# Patient Record
Sex: MALE | Race: WHITE | NOT HISPANIC OR LATINO | Employment: PART TIME | URBAN - NONMETROPOLITAN AREA
[De-identification: names, ages, dates, MRNs, and addresses within clinical notes are randomized per-mention and may not be internally consistent; named-entity substitution may affect disease eponyms.]

---
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
## 2021-04-28 ENCOUNTER — ALLIED HEALTH/NURSE VISIT (OUTPATIENT)
Dept: FAMILY MEDICINE | Facility: OTHER | Age: 21
End: 2021-04-28
Attending: FAMILY MEDICINE
Payer: COMMERCIAL

## 2021-04-28 DIAGNOSIS — R06.02 SOB (SHORTNESS OF BREATH): ICD-10-CM

## 2021-04-28 DIAGNOSIS — Z20.822 EXPOSURE TO 2019 NOVEL CORONAVIRUS: Primary | ICD-10-CM

## 2021-04-28 DIAGNOSIS — R51.9 HEADACHE: ICD-10-CM

## 2021-04-28 LAB
SARS-COV-2 RNA RESP QL NAA+PROBE: NORMAL
SPECIMEN SOURCE: NORMAL

## 2021-04-28 PROCEDURE — C9803 HOPD COVID-19 SPEC COLLECT: HCPCS

## 2021-04-28 PROCEDURE — U0005 INFEC AGEN DETEC AMPLI PROBE: HCPCS | Mod: ZL | Performed by: FAMILY MEDICINE

## 2021-04-28 PROCEDURE — U0003 INFECTIOUS AGENT DETECTION BY NUCLEIC ACID (DNA OR RNA); SEVERE ACUTE RESPIRATORY SYNDROME CORONAVIRUS 2 (SARS-COV-2) (CORONAVIRUS DISEASE [COVID-19]), AMPLIFIED PROBE TECHNIQUE, MAKING USE OF HIGH THROUGHPUT TECHNOLOGIES AS DESCRIBED BY CMS-2020-01-R: HCPCS | Mod: ZL | Performed by: FAMILY MEDICINE

## 2021-04-29 ENCOUNTER — TELEPHONE (OUTPATIENT)
Dept: FAMILY MEDICINE | Facility: OTHER | Age: 21
End: 2021-04-29

## 2021-04-29 LAB
LABORATORY COMMENT REPORT: ABNORMAL
SARS-COV-2 RNA RESP QL NAA+PROBE: POSITIVE
SPECIMEN SOURCE: ABNORMAL

## 2021-04-29 NOTE — TELEPHONE ENCOUNTER
Coronavirus (COVID-19) Notification    Reason for call  Notify of POSITIVE  COVID-19 lab result, assess symptoms,  review [facility] recommendations    Lab Result   Lab test for 2019-nCoV rRt-PCR or SARS-COV-2 PCR  Oropharyngeal AND/OR nasopharyngeal swabs were POSITIVE for 2019-nCoV RNA [OR] SARS-COV-2 RNA (COVID-19) RNA     We have been unable to reach Patient by phone at this time to notify of their Positive COVID-19 result.  Unable to leave a voicemail message requesting a call back to [phone] [facility] for results due to mailbow not setup.        POSITIVE COVID-19 Letter sent.    Rosa Mario LPN

## 2021-05-04 ENCOUNTER — TELEPHONE (OUTPATIENT)
Dept: FAMILY MEDICINE | Facility: OTHER | Age: 21
End: 2021-05-04

## 2021-05-04 NOTE — TELEPHONE ENCOUNTER
Contains critical data SARS-CoV-2 COVID-19 Virus (Coronavirus) by PCR  Order: [id number]  Status:  Edited Result - FINAL   Visible to patient:  No (inaccessible in [employer]) Dx:  Exposure to 2019 novel coronavirus  Specimen Information: Nasopharyngeal        Component 6d ago   SARS-CoV-2 Virus Specimen Source Nasopharyngeal    SARS-CoV-2 PCR Result POSITIVEPanic     Comment: SARS-CoV2 (COVID-19) RNA detected, presumed positive.   SARS-CoV-2 PCR Comment Testing was performed using the nancy SARS-CoV-2 assay on the nancy 6800 System.2    [facility]         Specimen Collected: 04/28/21 10:20 AM Last Resulted: 04/29/21 12:00 PM           Called and spoke to Patient after verifying last name and date of birth. Pt notified of the above results, and self-isolation recommendations outlined in result letter were reviewed over phone with Pt. The patient indicates understanding of these issues and agrees with the plan. Paloma Patel RN .............. 5/4/2021  3:59 PM

## 2021-07-31 ENCOUNTER — HOSPITAL ENCOUNTER (OUTPATIENT)
Dept: GENERAL RADIOLOGY | Facility: OTHER | Age: 21
End: 2021-07-31
Attending: NURSE PRACTITIONER
Payer: COMMERCIAL

## 2021-07-31 ENCOUNTER — OFFICE VISIT (OUTPATIENT)
Dept: FAMILY MEDICINE | Facility: OTHER | Age: 21
End: 2021-07-31
Attending: NURSE PRACTITIONER
Payer: COMMERCIAL

## 2021-07-31 VITALS
SYSTOLIC BLOOD PRESSURE: 132 MMHG | TEMPERATURE: 98.4 F | HEIGHT: 70 IN | RESPIRATION RATE: 12 BRPM | BODY MASS INDEX: 26.05 KG/M2 | HEART RATE: 84 BPM | DIASTOLIC BLOOD PRESSURE: 86 MMHG | WEIGHT: 182 LBS

## 2021-07-31 DIAGNOSIS — M25.572 PAIN IN JOINT INVOLVING ANKLE AND FOOT, LEFT: ICD-10-CM

## 2021-07-31 DIAGNOSIS — S82.402A CLOSED FRACTURE OF SHAFT OF LEFT FIBULA, UNSPECIFIED FRACTURE MORPHOLOGY, INITIAL ENCOUNTER: Primary | ICD-10-CM

## 2021-07-31 DIAGNOSIS — S99.912A ANKLE INJURY, LEFT, INITIAL ENCOUNTER: ICD-10-CM

## 2021-07-31 PROCEDURE — 73610 X-RAY EXAM OF ANKLE: CPT | Mod: LT

## 2021-07-31 PROCEDURE — 99213 OFFICE O/P EST LOW 20 MIN: CPT | Mod: 25 | Performed by: NURSE PRACTITIONER

## 2021-07-31 PROCEDURE — 29515 APPLICATION SHORT LEG SPLINT: CPT | Performed by: NURSE PRACTITIONER

## 2021-07-31 ASSESSMENT — PAIN SCALES - GENERAL: PAINLEVEL: SEVERE PAIN (7)

## 2021-07-31 ASSESSMENT — MIFFLIN-ST. JEOR: SCORE: 1842.83

## 2021-07-31 NOTE — PROGRESS NOTES
"HPI:    Charles Keller is a 20 year old male  who presents to [facility] today for left ankle    States yesterday he was skate boarding, fell off the board and twisted his left ankle. He is having pain and swelling in his left ankle.  Intermittent numbness and tingling in ankle.  Pain and difficulty bearing weight and ambulating.  Denies any other injuries.  Icing.  Took Aleve 2 tabs yesterday.       Past Medical History:   Diagnosis Date     Personal history of other diseases of the female genital tract     2001,Delivery 41 1/2 weeks gestation via .  One minute Apgar 3; 5 minute Apgar 9.  Possible meconium aspiration syndrome.  Birth weight 6 lbs. 0 oz.     Tachypnea, not elsewhere classified     , tachypnea     Past Surgical History:   Procedure Laterality Date     TONSILLECTOMY, ADENOIDECTOMY, COMBINED      08,for tonsillar hypertrophy and nighttime apnea.     Social History     Tobacco Use     Smoking status: Current Every Day Smoker     Types: Cigarettes     Smokeless tobacco: Never Used   Substance Use Topics     Alcohol use: Not Currently     Comment: Alcoholic Drinks/day: former, couple drinks once a year     Current Outpatient Medications   Medication Sig Dispense Refill     loratadine (CLARITIN) 10 MG tablet Take 10 mg by mouth daily       Allergies   Allergen Reactions     Chicken-Derived Products (Egg) Anaphylaxis     Coffee Bean Extract  [Coffea Arabica] Nausea     Certain kinds     Honey      Other reaction(s): Runny Nose  Itchy eyes     Pollen Extract      Other reaction(s): Runny Nose  Itchy eyes         Past medical history, past surgical history, current medications and allergies reviewed and accurate to the best of my knowledge.        ROS:  Refer to HPI    /86 (BP Location: Right arm, Patient Position: Sitting, Cuff Size: Adult Regular)   Pulse 84   Temp 98.4  F (36.9  C) (Tympanic)   Resp 12   Ht 1.772 m (5' 9.75\")   Wt 82.6 kg (182 lb)   BMI 26.30 " kg/m      EXAM:  General Appearance: Well appearing male adolescent, appropriate appearance for age. No acute distress  Cardiovascular:  CMS intact to left lower extremity, brisk capillary refill, strong pedal pulse  Musculoskeletal:  Equal movement of bilateral upper extremities.  Equal movement of bilateral lower extremities with exception of left ankle.  Left lateral ankle with swelling and tenderness over the lateral malleolus with visible/palpable swelling/deformity.  Mildly decreased ROM of left ankle due to guarding and pain.  Able to wiggle left toes without difficulty.  Normal gait.   Dermatological: skin intact to left lower leg, ankle and foot with some bruising present   Psychological: normal affect, alert, oriented, and pleasant.       Xray:  Results for orders placed or performed in visit on 07/31/21   XR Ankle Left G/E 3 Views     Status: None    Narrative    Exam: XR ANKLE LEFT G/E 3 VIEWS     History:Male, age 20 years, Ankle injury, left, initial encounter;  Pain in joint involving ankle and foot, left    Comparison:  None    Technique: Three views are submitted.    Findings: Bones are normally mineralized. There is a nondisplaced  spiral fracture of the distal fibula at the level of the distal  tibiofibular articulation. Ankle mortise is maintained. No evidence of  dislocation.           Impression    Impression:  Nondisplaced spiral/oblique intraarticular fracture of the distal  fibula extending into the distal tibiofibular articulation.    Anterolateral soft tissue swelling.    RAOUL MORRISSEY MD         SYSTEM ID:  [id number]         ASSESSMENT/PLAN:    I have reviewed the nursing notes.  I have reviewed the findings, diagnosis, plan and need for follow up with the patient.    1. Ankle injury, left, initial encounter    - XR Ankle Left G/E 3 Views    2. Pain in joint involving ankle and foot, left    - XR Ankle Left G/E 3 Views    3. Closed fracture of shaft of left fibula, unspecified  fracture morphology, initial encounter    - APPLY SHORT LEG SPLINT    - Crutches Order for DME - ONLY FOR DME    - Orthopedic  Referral; Future    Xray of left ankle completed and reviewed, fracture of distal fibula appreciated, radiologist over read:  Nondisplaced spiral/oblique intraarticular fracture of the distal fibula extending into the distal tibiofibular articulation.    Patient placed in a well padded short leg splint with ankle stirrup and posterior support.      NWB in splint with crutches    Keep splint clean and dry    Ice and elevate to reduce swelling    May use over-the-counter Tylenol or ibuprofen or naproxen PRN    Referral to orthopedics for evaluation and management         I explained my diagnostic considerations and recommendations to the patient, who voiced understanding and agreement with the treatment plan. All questions were answered. We discussed potential side effects of any prescribed or recommended therapies, as well as expectations for response to treatments.

## 2021-07-31 NOTE — LETTER
[facility]  [address]  [place] [address]  Phone: [phone]  Fax: [phone]    July 31, 2021        Charles Keller  [address] [place] [id number]          To whom it may concern:    RE: Charles Keller    Patient was seen and treated today at our clinic for left ankle fracture.  Patient is required to be no weight bearing on left leg and use crutches or a knee scooter.  Patient is able to work if work accommodations can be met.      Please contact me for questions or concerns.      Sincerely,        Sima Gonzalez NP

## 2021-07-31 NOTE — NURSING NOTE
"Chief Complaint   Patient presents with     Ankle Pain     left- skateboarding injury yesterday around 5-6pm     Ankle is swollen and bruised.    Initial /86 (BP Location: Right arm, Patient Position: Sitting, Cuff Size: Adult Regular)   Pulse 84   Temp 98.4  F (36.9  C) (Tympanic)   Resp 12   Ht 1.772 m (5' 9.75\")   Wt 82.6 kg (182 lb)   BMI 26.30 kg/m   Estimated body mass index is 26.3 kg/m  as calculated from the following:    Height as of this encounter: 1.772 m (5' 9.75\").    Weight as of this encounter: 82.6 kg (182 lb).  Medication Reconciliation: Completed     Henrry Suh LPN  "

## 2021-11-16 ENCOUNTER — ALLIED HEALTH/NURSE VISIT (OUTPATIENT)
Dept: FAMILY MEDICINE | Facility: OTHER | Age: 21
End: 2021-11-16
Attending: FAMILY MEDICINE
Payer: COMMERCIAL

## 2021-11-16 DIAGNOSIS — R11.10 VOMITING: Primary | ICD-10-CM

## 2021-11-16 DIAGNOSIS — R51.9 HEAD ACHE: ICD-10-CM

## 2021-11-16 PROCEDURE — C9803 HOPD COVID-19 SPEC COLLECT: HCPCS

## 2021-11-16 PROCEDURE — U0003 INFECTIOUS AGENT DETECTION BY NUCLEIC ACID (DNA OR RNA); SEVERE ACUTE RESPIRATORY SYNDROME CORONAVIRUS 2 (SARS-COV-2) (CORONAVIRUS DISEASE [COVID-19]), AMPLIFIED PROBE TECHNIQUE, MAKING USE OF HIGH THROUGHPUT TECHNOLOGIES AS DESCRIBED BY CMS-2020-01-R: HCPCS | Mod: ZL

## 2021-11-18 LAB — SARS-COV-2 RNA RESP QL NAA+PROBE: NEGATIVE

## 2021-12-04 ENCOUNTER — HEALTH MAINTENANCE LETTER (OUTPATIENT)
Age: 21
End: 2021-12-04

## 2022-04-10 ENCOUNTER — OFFICE VISIT (OUTPATIENT)
Dept: FAMILY MEDICINE | Facility: OTHER | Age: 22
End: 2022-04-10
Attending: FAMILY MEDICINE
Payer: COMMERCIAL

## 2022-04-10 VITALS
HEART RATE: 69 BPM | RESPIRATION RATE: 16 BRPM | OXYGEN SATURATION: 97 % | BODY MASS INDEX: 25.9 KG/M2 | DIASTOLIC BLOOD PRESSURE: 74 MMHG | SYSTOLIC BLOOD PRESSURE: 120 MMHG | WEIGHT: 179.2 LBS | TEMPERATURE: 98.6 F

## 2022-04-10 DIAGNOSIS — R11.2 NAUSEA AND VOMITING, INTRACTABILITY OF VOMITING NOT SPECIFIED, UNSPECIFIED VOMITING TYPE: Primary | ICD-10-CM

## 2022-04-10 PROCEDURE — 99213 OFFICE O/P EST LOW 20 MIN: CPT | Performed by: NURSE PRACTITIONER

## 2022-04-10 ASSESSMENT — PAIN SCALES - GENERAL: PAINLEVEL: MODERATE PAIN (4)

## 2022-04-10 NOTE — LETTER
April 10, 2022                                                                     To Whom it May Concern:    Charles CRISTIAN Cohenchandan attended clinic here on Apr 10, 2022. May return to work on 4/11/2022.       Sincerely,    Kandace Richards NP

## 2022-04-11 NOTE — NURSING NOTE
"Chief Complaint   Patient presents with     Vomiting     Vomited twice today     He woke up this morning feeling nauseated and light headed he vomited twice.  Lisa Suarez LPN..................4/10/2022   7:16 PM    Initial /74   Pulse 69   Temp 98.6  F (37  C) (Temporal)   Resp 16   Wt 81.3 kg (179 lb 3.2 oz)   SpO2 97%   BMI 25.90 kg/m   Estimated body mass index is 25.9 kg/m  as calculated from the following:    Height as of 7/31/21: 1.772 m (5' 9.75\").    Weight as of this encounter: 81.3 kg (179 lb 3.2 oz).  Medication Reconciliation: complete    FOOD SECURITY SCREENING QUESTIONS  Hunger Vital Signs:  Within the past 12 months we worried whether our food would run out before we got money to buy more. Often  Within the past 12 months the food we bought just didn't last and we didn't have money to get more. Often        Advance care directive on file? no  Advance care directive provided to patient? declined     Lisa Suarez, REGAN  "

## 2022-04-11 NOTE — PROGRESS NOTES
ASSESSMENT/PLAN:    I have reviewed the nursing notes.  I have reviewed the findings, diagnosis, plan and need for follow up with the patient.    1. Nausea and vomiting, intractability of vomiting not specified, unspecified vomiting type  Ongoing symptoms < 24 hours and generally improving which is reassuring. Normal exam and vital signs. Push oral hydration. Labwork and imaging are not warranted today. Suspect viral illness versus stomach sensitivity due to some chronic intermittent issues with vomiting , not associated with abdominal pain.   Offered covid test, declined.    Follow up if symptoms persist or worsen or concerns    I explained my diagnostic considerations and recommendations to the patient, who voiced understanding and agreement with the treatment plan. All questions were answered. We discussed potential side effects of any prescribed or recommended therapies, as well as expectations for response to treatments.    Kandace Richards NP  4/10/2022  7:18 PM    HPI:  Charles Keller is a 21 year old male who presents to [facility] today for concerns of woke up feeling light headed and nauseated this morning. He has vomited 2x today. No diarrhea or constipation. No abdominal pain. He does feel tired, is keeping water down. Symptoms are improving. Reports sensitive stomach; sometimes textures/smells of foods will cause him to throw up. Sometimes in the morning he does not feel great but that is not new. No one else around him is sick. Declines covid testing.     ROS otherwise negative.     Past Medical History:   Diagnosis Date     Personal history of other diseases of the female genital tract     2001,Delivery 41 1/2 weeks gestation via .  One minute Apgar 3; 5 minute Apgar 9.  Possible meconium aspiration syndrome.  Birth weight 6 lbs. 0 oz.     Tachypnea, not elsewhere classified     , tachypnea     Past Surgical History:   Procedure Laterality Date     TONSILLECTOMY,  ADENOIDECTOMY, COMBINED      08/05/08,for tonsillar hypertrophy and nighttime apnea.     Social History     Tobacco Use     Smoking status: Current Every Day Smoker     Types: Cigarettes     Smokeless tobacco: Never Used   Substance Use Topics     Alcohol use: Not Currently     Comment: Alcoholic Drinks/day: former, couple drinks once a year     Current Outpatient Medications   Medication Sig Dispense Refill     loratadine (CLARITIN) 10 MG tablet Take 10 mg by mouth daily       Allergies   Allergen Reactions     Chicken-Derived Products (Egg) Anaphylaxis     Honey      Other reaction(s): Runny Nose  Itchy eyes     Pollen Extract      Other reaction(s): Runny Nose  Itchy eyes     Past medical history, past surgical history, current medications and allergies reviewed and accurate to the best of my knowledge.      ROS:  Refer to HPI    /74   Pulse 69   Temp 98.6  F (37  C) (Temporal)   Resp 16   Wt 81.3 kg (179 lb 3.2 oz)   SpO2 97%   BMI 25.90 kg/m      EXAM:  General Appearance: Well appearing 21 year old male, appropriate appearance for age. No acute distress   Ears: Left TM intact, translucent with bony landmarks appreciated, no erythema, no effusion, no bulging, no purulence.  Right TM intact, translucent with bony landmarks appreciated, no erythema, no effusion, no bulging, no purulence.  Left auditory canal clear.  Right auditory canal clear.  Normal external ears, non tender.  Eyes: conjunctivae normal without erythema or irritation, corneas clear, no drainage or crusting, no eyelid swelling, pupils equal   Oropharynx: moist mucous membranes, posterior pharynx without erythema, tonsils symmetric, no erythema, no exudates or petechiae, no trismus, voice clear.    Sinuses:  No sinus tenderness upon palpation of the frontal or maxillary sinuses  Nose:  Bilateral nares: no erythema, no edema, no drainage or congestion   Neck: supple without adenopathy  Respiratory: normal chest wall and respirations.   Normal effort.  Clear to auscultation bilaterally, no wheezing, crackles or rhonchi.  No increased work of breathing.  No cough appreciated.  Cardiac: RRR with no murmurs  Abdomen: soft, nontender, no rigidity, no rebound tenderness or guarding, normal bowel sounds present  Musculoskeletal:  Equal movement of bilateral upper extremities.  Equal movement of bilateral lower extremities.  Normal gait.    Psychological: normal affect, alert, oriented, and pleasant.

## 2022-04-11 NOTE — PATIENT INSTRUCTIONS
Push oral hydration. It is reassuring symptoms are improving.     No concerns of appendicitis.

## 2022-05-01 ENCOUNTER — OFFICE VISIT (OUTPATIENT)
Dept: FAMILY MEDICINE | Facility: OTHER | Age: 22
End: 2022-05-01
Attending: PHYSICIAN ASSISTANT
Payer: OTHER MISCELLANEOUS

## 2022-05-01 VITALS
DIASTOLIC BLOOD PRESSURE: 70 MMHG | RESPIRATION RATE: 12 BRPM | OXYGEN SATURATION: 100 % | BODY MASS INDEX: 25.55 KG/M2 | SYSTOLIC BLOOD PRESSURE: 110 MMHG | TEMPERATURE: 97.3 F | HEART RATE: 57 BPM | WEIGHT: 176.8 LBS

## 2022-05-01 DIAGNOSIS — Z23 NEED FOR TETANUS BOOSTER: ICD-10-CM

## 2022-05-01 DIAGNOSIS — S61.412A LACERATION OF LEFT HAND WITHOUT FOREIGN BODY, INITIAL ENCOUNTER: Primary | ICD-10-CM

## 2022-05-01 PROCEDURE — 12001 RPR S/N/AX/GEN/TRNK 2.5CM/<: CPT | Performed by: PHYSICIAN ASSISTANT

## 2022-05-01 PROCEDURE — 90715 TDAP VACCINE 7 YRS/> IM: CPT | Performed by: PHYSICIAN ASSISTANT

## 2022-05-01 PROCEDURE — 250N000009 HC RX 250: Performed by: PHYSICIAN ASSISTANT

## 2022-05-01 PROCEDURE — 90471 IMMUNIZATION ADMIN: CPT | Performed by: PHYSICIAN ASSISTANT

## 2022-05-01 PROCEDURE — 99213 OFFICE O/P EST LOW 20 MIN: CPT | Mod: 25 | Performed by: PHYSICIAN ASSISTANT

## 2022-05-01 PROCEDURE — 250N000013 HC RX MED GY IP 250 OP 250 PS 637: Performed by: PHYSICIAN ASSISTANT

## 2022-05-01 RX ORDER — LIDOCAINE HYDROCHLORIDE 10 MG/ML
5 INJECTION, SOLUTION EPIDURAL; INFILTRATION; INTRACAUDAL; PERINEURAL ONCE
Status: COMPLETED | OUTPATIENT
Start: 2022-05-01 | End: 2022-05-01

## 2022-05-01 RX ORDER — NEOMYCIN/BACITRACIN/POLYMYXINB 3.5-400-5K
OINTMENT (GRAM) TOPICAL ONCE
Status: COMPLETED | OUTPATIENT
Start: 2022-05-01 | End: 2022-05-01

## 2022-05-01 RX ADMIN — LIDOCAINE HYDROCHLORIDE 5 ML: 10 INJECTION, SOLUTION EPIDURAL; INFILTRATION; INTRACAUDAL; PERINEURAL at 16:45

## 2022-05-01 RX ADMIN — BACITRACIN ZINC, NEOMYCIN, POLYMYXIN B: 400; 3.5; 5 OINTMENT TOPICAL at 16:45

## 2022-05-01 ASSESSMENT — PAIN SCALES - GENERAL: PAINLEVEL: MODERATE PAIN (4)

## 2022-05-01 NOTE — PATIENT INSTRUCTIONS
Please refer to your AVS for follow up and pain/symptoms management recommendations (I.e.: medications, helpful conservative treatment modalities, appropriate follow up if need to a specialist or family practice, etc.). Please return to urgent care if your symptoms change or worsen.     Discharge instructions:  -If you were prescribed a medication(s), please take this as prescribed/directed  -Monitor your symptoms, if changing/worsening, return to UC/ER or PCP for follow up    Laceration   -Depending on the extent of your wound it was closed with either dermabond glue, staples or sutures if needed.   -Most sutures/stitches stay in place for 10-14 days - your primary care provider can remove this  -Please continue to keep area clean/dry for 24 hrs. Dressing changes daily for 2-3 days.   -Monitor for infection.  Monitor for signs of infection such as fevers, chills, increasing redness/warmth of the site.   -Avoid swimming in pools/lakes until your site is healed.  -If your tetanus status is out of date, the urgent care provider likely discussed this with you. We recommend keeping your immunizations and tetanus status up to date.   -For pain control (if needed), if you are able to take Ibuprofen and Tylenol, we recommend alternating these (see note below). Do not wear a patch over your eye (unless directed to do so).    -Alternate every 4 hours as needed. I.e.: Ibuprofen at 8am, Tylenol 12pm, Ibuprofen 4pm    -Daily maximum of Tylenol is 4000mg (recommend staying under 3000mg)   -Daily maximum of Ibuprofen is 3200 mg

## 2022-05-01 NOTE — NURSING NOTE
Chief Complaint   Patient presents with     Hand Injury     Left hand laceration          Medication Reconciliation: ricky Wetzel

## 2022-05-01 NOTE — PROGRESS NOTES
ASSESSMENT/PLAN:    I have reviewed the nursing notes.  I have reviewed the findings, diagnosis, plan and need for follow up with the patient.    1. Laceration of left hand without foreign body, initial encounter  - lidocaine (PF) (XYLOCAINE) 1 % injection 5 mL  - neomycin-bacitracin-polymyxin (NEOSPORIN) ointment  - REPAIR SUPERFICIAL, WOUND BODY < =2.5CM  - Vital signs stable. PE consistent with laceration of left palm. Refer to procedure note below for further description of suture/laceration repair. Suture removal in 10-14 days. Keep clean, dry and covered. Can shower as usual, avoid swimming in hot tubs/pools/lakes until sutures removed and laceration healed as can lead to potential infection. Tetanus: updated today during visit. Antibiotic: triple antibiotic over site. Monitor for fevers, chills, signs of infection/cellulitis - if any concerning symptoms arise, patient agreeable to return. Patient is in agreement and understanding of the above treatment plan. All questions and concerns were addressed and answered to patient's satisfaction. AVS reviewed with patient.     Work comp follow up: 5/11/22 at 200 pm. Keep hand clean, dry and covered at work. No other restrictions.     For hand lacerations: wear gloves if performing duties/tasks where contamination can occur such as washing dishes (dirty water), gardening/mowing lawn/outdoor tasks, other tasks, etc.     2. Need for tetanus booster  - TDAP VACCINE (Adacel, Boostrix)  [[id number]]  - Last tetanus 8/2012. Due for update today. This was provided. Monitored x 15 minutes and tolerated well.     Discussed warning signs/symptoms indicative of need to f/u    Follow up if symptoms persist or worsen or concerns    I explained my diagnostic considerations and recommendations to the patient, who voiced understanding and agreement with the treatment plan. All questions were answered. We discussed potential side effects of any prescribed or recommended therapies, as  well as expectations for response to treatments.    Toyin Britton PA-C  2022  4:28 PM    HPI:    Charles Keller is a 21 year old male  who presents to [facility] today for concerns of laceration to left hand (palm) - was polishing a glass and stem broke. Injury occurred at 1.5 hours ago. Hemostasis control: good.    Pain: 4/10  Changes to ROM/Strength: none  Treatments tried since injury: pressure dressing.     Any allergies to suture or latex: No  Prior experience with local anesthetics: Yes  Any adverse reaction to local anesthetics: No    Patient on blood thinners: No    Denies LOC. Denies SOB, fevers, chills, local or systemic signs of infection.     Immunization (Tetanus) UTD: No, 12    PCP: MD Nathalia    Past Medical History:   Diagnosis Date     Personal history of other diseases of the female genital tract     2001,Delivery 41 1/2 weeks gestation via .  One minute Apgar 3; 5 minute Apgar 9.  Possible meconium aspiration syndrome.  Birth weight 6 lbs. 0 oz.     Tachypnea, not elsewhere classified     , tachypnea     Past Surgical History:   Procedure Laterality Date     TONSILLECTOMY, ADENOIDECTOMY, COMBINED      08,for tonsillar hypertrophy and nighttime apnea.     Social History     Tobacco Use     Smoking status: Current Every Day Smoker     Types: Cigarettes     Smokeless tobacco: Never Used   Substance Use Topics     Alcohol use: Not Currently     Comment: Alcoholic Drinks/day: former, couple drinks once a year     Current Outpatient Medications   Medication Sig Dispense Refill     loratadine (CLARITIN) 10 MG tablet Take 10 mg by mouth daily       Allergies   Allergen Reactions     Chicken-Derived Products (Egg) Anaphylaxis     Honey      Other reaction(s): Runny Nose  Itchy eyes     Pollen Extract      Other reaction(s): Runny Nose  Itchy eyes     Past medical history, past surgical history, current medications and allergies reviewed and accurate to the best  of my knowledge.      ROS:  Refer to HPI    /70   Pulse 57   Temp 97.3  F (36.3  C) (Tympanic)   Resp 12   Wt 80.2 kg (176 lb 12.8 oz)   SpO2 100%   BMI 25.55 kg/m       EXAM:  General Appearance: Well appearing 21 year old male, appropriate appearance for age. No acute distress   Respiratory: normal chest wall and respirations.  Normal effort.  Clear to auscultation bilaterally, no wheezing, crackles or rhonchi.  No increased work of breathing.  No cough appreciated.  Cardiac: RRR with no murmurs  MSK:  Left HAND PHYSICAL EXAMINATION:  Inspection: no signs of edema, bony deformity noted.    Palpation: Tenderness to palpation laceration site. Tenderness to palpation over anatomical snuffbox/scaphoid: No.     ROM:    Thumb adduction/abduction/flexion/extension: ROM is full, fluid and intact   Finger flexion/extension (MCP, DIP, PIP): ROM is full, fluid and intact   Abduction/Adduction (2-5th MCP joint): ROM is full, fluid and intact   Opposition: intact     strength: intact    Special testing: Travis exam normal    Neurovascular status: sensation and distal pulses intact.   Dermatological: 2.5 cm laceration to left palm between digits 2-3, horizontal orientation, clean, mild fascial violation. No foreign bodies.   Psychological: normal affect, alert, oriented, and pleasant.     Labs:  None     Xray:  None     Procedural note:   Options are discussed and patient decided to proceed with the suture placement.  Risks and benefits discussed.  Written consent obtained.    Preparation: Patient was prepped and draped in usual sterile fashion.  Irrigation solution: saline   Body area: left palm  Laceration description: see above  Laceration length: see above   Contamination: No  Debridement: No  Foreign bodies: No  Tendon involvement: No  Anesthesia: Local  Anesthetic Type: Lidocaine 1% without epinephrine  Anesthetic Total: 1.5 mL  Closure: Simple  Suture: 4-0 nylon, nonabsorbable, interrupted  Number of  Sutures: 3  Approximation: close  Suture removal in 10-14 day(s)     Patient tolerance: Patient tolerated the procedure well with no immediate complications.

## 2022-05-12 ENCOUNTER — OFFICE VISIT (OUTPATIENT)
Dept: FAMILY MEDICINE | Facility: OTHER | Age: 22
End: 2022-05-12
Attending: CHIROPRACTOR
Payer: OTHER MISCELLANEOUS

## 2022-05-12 VITALS
DIASTOLIC BLOOD PRESSURE: 74 MMHG | SYSTOLIC BLOOD PRESSURE: 116 MMHG | OXYGEN SATURATION: 98 % | WEIGHT: 175.6 LBS | BODY MASS INDEX: 25.38 KG/M2 | HEART RATE: 88 BPM | RESPIRATION RATE: 16 BRPM | TEMPERATURE: 98 F

## 2022-05-12 DIAGNOSIS — S61.412A LACERATION OF LEFT HAND WITHOUT FOREIGN BODY, INITIAL ENCOUNTER: Primary | ICD-10-CM

## 2022-05-12 PROCEDURE — 99213 OFFICE O/P EST LOW 20 MIN: CPT | Performed by: CHIROPRACTOR

## 2022-05-12 ASSESSMENT — PAIN SCALES - GENERAL: PAINLEVEL: NO PAIN (0)

## 2022-05-12 NOTE — PROGRESS NOTES
CHIEF COMPLAINT:   Chief Complaint   Patient presents with     Work Comp     Follow-up        HISTORY OF PRESENTING INJURY     Patient is here for removal of sutures related to a work injury on 2022.  Patient was holding a wine glass base with his left hand and polishing the glass with his right hand. The glass broke and caused a laceration injury on the left palm.  He presented [facility] and sutures were placed.  Charles denies any fever, infection, or other abnormal symptoms since the incident.  He has been keeping the wound site clean and dry.      PAST MEDICAL HISTORY:  Past Medical History:   Diagnosis Date     Personal history of other diseases of the female genital tract     2001,Delivery 41 1/2 weeks gestation via .  One minute Apgar 3; 5 minute Apgar 9.  Possible meconium aspiration syndrome.  Birth weight 6 lbs. 0 oz.     Tachypnea, not elsewhere classified     , tachypnea       PAST SURGICAL HISTORY:  Past Surgical History:   Procedure Laterality Date     TONSILLECTOMY, ADENOIDECTOMY, COMBINED      08,for tonsillar hypertrophy and nighttime apnea.       ALLERGIES:  Allergies   Allergen Reactions     Chicken-Derived Products (Egg) Anaphylaxis     Honey      Other reaction(s): Runny Nose  Itchy eyes     Pollen Extract      Other reaction(s): Runny Nose  Itchy eyes       CURRENT MEDICATIONS:  Current Outpatient Medications   Medication Sig Dispense Refill     loratadine (CLARITIN) 10 MG tablet Take 10 mg by mouth daily         SOCIAL HISTORY:  Social History     Socioeconomic History     Marital status: Single     Spouse name: Not on file     Number of children: Not on file     Years of education: Not on file     Highest education level: Not on file   Occupational History     Not on file   Tobacco Use     Smoking status: Current Every Day Smoker     Types: Cigarettes     Smokeless tobacco: Never Used   Vaping Use     Vaping Use: Every day   Substance and Sexual Activity      Alcohol use: Not Currently     Comment: Alcoholic Drinks/day: former, couple drinks once a year     Drug use: Yes     Types: Other, Marijuana     Comment: daily      Sexual activity: Yes     Partners: Female   Other Topics Concern     Not on file   Social History Narrative    Second child of Ava Keller, single mom.      Mom works at [employer]    Father:  Not involved    Ava Mother    Sibling:  Older sister    Half siblings, brother and sister.    Preload  8/23/2013.at [facility] for 35 day eval as of 8/29/13, previously living with mom,     Social Determinants of Health     Financial Resource Strain: Not on file   Food Insecurity: Not on file   Transportation Needs: Not on file   Physical Activity: Not on file   Stress: Not on file   Social Connections: Not on file   Intimate Partner Violence: Not on file   Housing Stability: Not on file       FAMILY HISTORY:  Family History   Problem Relation Age of Onset     Family History Negative Mother         Good Health     Family History Negative Father         Good Health     Family History Negative Sister         Good Health       REVIEW OF SYSTEMS:    Skin: positive for as above  Eyes: negative  Ears/Nose/Throat: negative  Respiratory: No shortness of breath, dyspnea on exertion, cough, or hemoptysis  Cardiovascular: negative  Gastrointestinal: negative  Genitourinary: negative  Musculoskeletal: negative  Neurologic: negative  Psychiatric: negative  Hematologic/Lymphatic/Immunologic: negative  Endocrine: negative      PHYSICAL EXAM:   /74 (BP Location: Right arm, Patient Position: Sitting, Cuff Size: Adult Regular)   Pulse 88   Temp 98  F (36.7  C) (Tympanic)   Resp 16   Wt 79.7 kg (175 lb 9.6 oz)   SpO2 98%   BMI 25.38 kg/m   Body mass index is 25.38 kg/m . General Appearance: No acute distress.  No signs of infection or adjacent skin redness.         IMPRESSION/PLAN:    Sutures removed per Kandace Felder.  New workability completed and  signed by patient.  Two copies issued to him.  Return as needed.    Total time spent today in chart preparation: 6 minutes  Total time spent today in face to face evaluation: 10 minutes  Total time spent today in documentation: 8 minutes.        Carlos Zhong DC, CARMEN, CICE  Director - Occupational Medicine Department  Diplomate - American Board of Forensic Professionals  Board Certified - American Board of Independent Medical Examiners  [facility]  [address] [place] [id number]  Phone [phone]  Fax [phone]      10:34 AM 5/12/2022

## 2022-05-12 NOTE — NURSING NOTE
Chief Complaint   Patient presents with     Work Comp     Follow-up      Suture of L hand.     Medication Reconciliation: complete    Kandace Rogers, LPN

## 2022-09-17 ENCOUNTER — HEALTH MAINTENANCE LETTER (OUTPATIENT)
Age: 22
End: 2022-09-17

## 2023-01-28 ENCOUNTER — HEALTH MAINTENANCE LETTER (OUTPATIENT)
Age: 23
End: 2023-01-28

## 2024-02-24 ENCOUNTER — HEALTH MAINTENANCE LETTER (OUTPATIENT)
Age: 24
End: 2024-02-24

## 2025-03-09 ENCOUNTER — HEALTH MAINTENANCE LETTER (OUTPATIENT)
Age: 25
End: 2025-03-09

## (undated) RX ORDER — NEOMYCIN/BACITRACIN/POLYMYXINB 3.5-400-5K
OINTMENT (GRAM) TOPICAL
Status: DISPENSED
Start: 2022-05-01

## (undated) RX ORDER — LIDOCAINE HYDROCHLORIDE 10 MG/ML
INJECTION, SOLUTION EPIDURAL; INFILTRATION; INTRACAUDAL; PERINEURAL
Status: DISPENSED
Start: 2022-05-01